# Patient Record
Sex: MALE | Race: WHITE | Employment: FULL TIME | URBAN - METROPOLITAN AREA
[De-identification: names, ages, dates, MRNs, and addresses within clinical notes are randomized per-mention and may not be internally consistent; named-entity substitution may affect disease eponyms.]

---

## 2019-05-08 ENCOUNTER — HOSPITAL ENCOUNTER (EMERGENCY)
Age: 24
Discharge: HOME | End: 2019-05-08
Payer: COMMERCIAL

## 2019-05-08 VITALS
BODY MASS INDEX: 27.28 KG/M2 | TEMPERATURE: 98.8 F | OXYGEN SATURATION: 98 % | SYSTOLIC BLOOD PRESSURE: 110 MMHG | WEIGHT: 180 LBS | HEIGHT: 68 IN | DIASTOLIC BLOOD PRESSURE: 56 MMHG | RESPIRATION RATE: 14 BRPM | HEART RATE: 80 BPM

## 2019-05-08 DIAGNOSIS — L23.7 POISON IVY DERMATITIS: Primary | ICD-10-CM

## 2019-05-08 PROCEDURE — 99282 EMERGENCY DEPT VISIT SF MDM: CPT

## 2019-05-08 PROCEDURE — 96372 THER/PROPH/DIAG INJ SC/IM: CPT

## 2019-05-08 PROCEDURE — 6360000002 HC RX W HCPCS

## 2019-05-08 RX ORDER — TRIAMCINOLONE ACETONIDE 40 MG/ML
INJECTION, SUSPENSION INTRA-ARTICULAR; INTRAMUSCULAR
Status: COMPLETED
Start: 2019-05-08 | End: 2019-05-08

## 2019-05-08 RX ORDER — TRIAMCINOLONE ACETONIDE 40 MG/ML
40 INJECTION, SUSPENSION INTRA-ARTICULAR; INTRAMUSCULAR ONCE
Status: COMPLETED | OUTPATIENT
Start: 2019-05-08 | End: 2019-05-08

## 2019-05-08 RX ADMIN — TRIAMCINOLONE ACETONIDE 40 MG: 40 INJECTION, SUSPENSION INTRA-ARTICULAR; INTRAMUSCULAR at 21:10

## 2019-05-08 NOTE — LETTER
5 [facility] Emergency Department  [address] [place] [id number]  Phone: [phone]             May 8, 2019    Patient: Angie Bingham   YOB: 1995   Date of Visit: 5/8/2019       To Whom It May Concern:    Marshal Abraham was seen and treated in our emergency department on 5/8/2019. He may return to work on 05/10/2019.       Sincerely,             Signature:__________________________________

## 2019-05-09 NOTE — ED PROVIDER NOTES
[facility]        Department of Emergency Medicine   ED  Provider Note  Admit Date/RoomTime: 5/8/2019  7:59 PM  ED Room: 36/36  Chief Complaint   Rash (for the past 3 days; bilateral feet; worse on R side)    History of Present Illness   Source of history provided by:  patient. History/Exam Limitations: none. Vero Knott is a 27 y.o. old male with has a past medical history of: History reviewed. No pertinent past medical history. presents to the emergency department by private vehicle, for complaint of sudden onset red, raised and itchy area on  Top of right foot which began 3 day(s) prior to arrival.  The symptoms were caused by poison ivy. Since onset the symptoms have been persistent. Prior history of similar episodes: Yes. His symptoms are associated with nothing additional and relieved by nothing. He denies any difficulty breathing, difficulty swallowing, wheezing, throat tightness, hoarseness, facial swelling, lip swelling or tongue swelling. ROS    Pertinent positives and negatives are stated within HPI, all other systems reviewed and are negative. Past Surgical History:   Procedure Laterality Date    ABDOMEN SURGERY     Social History:  reports that he has been smoking. He has been smoking about 0.25 packs per day. He has never used smokeless tobacco. He reports that he does not drink alcohol. Family History: family history is not on file. Allergies: Patient has no known allergies. Physical Exam           ED Triage Vitals   BP Temp Temp src Pulse Resp SpO2 Height Weight   05/08/19 2024 05/08/19 2024 -- 05/08/19 2024 05/08/19 2024 05/08/19 2024 05/08/19 2027 05/08/19 2027   (!) 110/56 98.8 °F (37.1 °C)  80 14 98 % 5' 8\" (1.727 m) 180 lb (81.6 kg)     Oxygen Saturation Interpretation: Normal.    Constitutional:  Alert, development consistent with age. HEENT:  NC/NT. Airway patent. Eyes:  PERRL, EOMI, no discharge. Ears:  TMs without perforation, injection, or bulging. External canals clear without exudate. Mouth:  Mucous membranes moist without lesions, tongue and gums normal.  Throat:  Pharynx without injection, exudate, or tonsillar hypertrophy. Airway patient. Neck:  Supple. No lymphadenopathy. Respiratory:  Clear to auscultation and breath sounds equal.  CV:  Regular rate and rhythm. GI:  Abdomen Soft, nontender, +BS. Integument:  Skin turgor: Normal.              Grouped areas of tight vesicles, non tender, over dorsum of right foot, a few scattered single vesicles on the top of the left foot. Neurological:  Orientation age-appropriate unless noted elseware. Motor functions intact. Lab / Imaging Results   (All laboratory and radiology results have been personally reviewed by myself)  Labs:  No results found for this visit on 05/08/19. Imaging: All Radiology results interpreted by Radiologist unless otherwise noted. No orders to display       ED Course / Medical Decision Making     Medications   triamcinolone acetonide (KENALOG-40) injection 40 mg (has no administration in time range)        Consults:   None    Procedures:   none    MDM:   Patient presents with known exposure to poison ivy at home and a history of getting somewhat severe reactions. He reports this reaction is one of the least severe he's had through the years. Rash consistent in its appearance with poison ivy. Shot, was administered in department and home with a Westcort 2% hydrocortisone cream. Patient advised follow-up with primary care doctor as needed. Counseling: The emergency provider has spoken with the patient and discussed todays results, in addition to providing specific details for the plan of care and counseling regarding the diagnosis and prognosis. Questions are answered at this time and they are agreeable with the plan. Assessment      1.  Poison ivy dermatitis      Plan   Discharge to home  Patient condition is good    New Medications     New Prescriptions HYDROCORTISONE (WESTCORT) 0.2 % CREAM    Apply topically 2 times daily. Electronically signed by Zandra Valdivia PA-C   DD: 5/8/19  **This report was transcribed using voice recognition software. Every effort was made to ensure accuracy; however, inadvertent computerized transcription errors may be present.   END OF ED PROVIDER NOTE       Zandra Valdivia PA-C  05/09/19 [id number]

## 2019-10-02 ENCOUNTER — APPOINTMENT (OUTPATIENT)
Dept: GENERAL RADIOLOGY | Age: 24
End: 2019-10-02
Payer: COMMERCIAL

## 2019-10-02 ENCOUNTER — HOSPITAL ENCOUNTER (EMERGENCY)
Age: 24
Discharge: HOME | End: 2019-10-02
Payer: COMMERCIAL

## 2019-10-02 VITALS
BODY MASS INDEX: 24.34 KG/M2 | SYSTOLIC BLOOD PRESSURE: 108 MMHG | TEMPERATURE: 98.7 F | HEIGHT: 70 IN | DIASTOLIC BLOOD PRESSURE: 65 MMHG | WEIGHT: 170 LBS | HEART RATE: 88 BPM | RESPIRATION RATE: 16 BRPM | OXYGEN SATURATION: 98 %

## 2019-10-02 DIAGNOSIS — M47.816 OSTEOARTHRITIS OF LUMBAR SPINE, UNSPECIFIED SPINAL OSTEOARTHRITIS COMPLICATION STATUS: ICD-10-CM

## 2019-10-02 DIAGNOSIS — S39.012A LUMBOSACRAL STRAIN, INITIAL ENCOUNTER: Primary | ICD-10-CM

## 2019-10-02 LAB
AMORPHOUS: ABNORMAL
BACTERIA: ABNORMAL /HPF
BILIRUBIN URINE: NEGATIVE
BLOOD, URINE: NEGATIVE
CLARITY: ABNORMAL
COLOR: YELLOW
GLUCOSE URINE: NEGATIVE MG/DL
KETONES, URINE: NEGATIVE MG/DL
LEUKOCYTE ESTERASE, URINE: NEGATIVE
NITRITE, URINE: NEGATIVE
PH UA: 8.5 (ref 5–9)
PROTEIN UA: NEGATIVE MG/DL
RBC UA: ABNORMAL /HPF (ref 0–2)
SPECIFIC GRAVITY UA: 1.01 (ref 1–1.03)
UROBILINOGEN, URINE: 0.2 E.U./DL
WBC UA: ABNORMAL /HPF (ref 0–5)

## 2019-10-02 PROCEDURE — 72110 X-RAY EXAM L-2 SPINE 4/>VWS: CPT

## 2019-10-02 PROCEDURE — 6360000002 HC RX W HCPCS: Performed by: NURSE PRACTITIONER

## 2019-10-02 PROCEDURE — 99283 EMERGENCY DEPT VISIT LOW MDM: CPT

## 2019-10-02 PROCEDURE — 72074 X-RAY EXAM THORAC SPINE4/>VW: CPT

## 2019-10-02 PROCEDURE — 96372 THER/PROPH/DIAG INJ SC/IM: CPT

## 2019-10-02 PROCEDURE — 81001 URINALYSIS AUTO W/SCOPE: CPT

## 2019-10-02 RX ORDER — CYCLOBENZAPRINE HCL 5 MG
5 TABLET ORAL 3 TIMES DAILY PRN
Qty: 12 TABLET | Refills: 0 | Status: SHIPPED | OUTPATIENT
Start: 2019-10-02

## 2019-10-02 RX ORDER — IBUPROFEN 800 MG/1
800 TABLET ORAL EVERY 6 HOURS PRN
Qty: 16 TABLET | Refills: 0 | Status: SHIPPED | OUTPATIENT
Start: 2019-10-02 | End: 2019-10-06

## 2019-10-02 RX ORDER — KETOROLAC TROMETHAMINE 30 MG/ML
30 INJECTION, SOLUTION INTRAMUSCULAR; INTRAVENOUS ONCE
Status: COMPLETED | OUTPATIENT
Start: 2019-10-02 | End: 2019-10-02

## 2019-10-02 RX ADMIN — KETOROLAC TROMETHAMINE 30 MG: 30 INJECTION, SOLUTION INTRAMUSCULAR at 11:19

## 2019-10-02 ASSESSMENT — PAIN DESCRIPTION - ORIENTATION: ORIENTATION: LEFT

## 2019-10-02 ASSESSMENT — PAIN DESCRIPTION - FREQUENCY: FREQUENCY: CONTINUOUS

## 2019-10-02 ASSESSMENT — PAIN DESCRIPTION - DESCRIPTORS: DESCRIPTORS: THROBBING;ACHING;BURNING

## 2019-10-02 ASSESSMENT — PAIN SCALES - GENERAL: PAINLEVEL_OUTOF10: 6

## 2019-10-02 ASSESSMENT — PAIN DESCRIPTION - LOCATION: LOCATION: BACK

## 2019-10-02 ASSESSMENT — PAIN DESCRIPTION - PAIN TYPE: TYPE: ACUTE PAIN
